# Patient Record
Sex: FEMALE | Race: BLACK OR AFRICAN AMERICAN | NOT HISPANIC OR LATINO | URBAN - METROPOLITAN AREA
[De-identification: names, ages, dates, MRNs, and addresses within clinical notes are randomized per-mention and may not be internally consistent; named-entity substitution may affect disease eponyms.]

---

## 2024-02-23 ENCOUNTER — HOSPITAL ENCOUNTER (EMERGENCY)
Facility: HOSPITAL | Age: 29
Discharge: HOME | End: 2024-02-23
Payer: COMMERCIAL

## 2024-02-23 VITALS
OXYGEN SATURATION: 100 % | TEMPERATURE: 97.9 F | BODY MASS INDEX: 32.1 KG/M2 | WEIGHT: 170 LBS | DIASTOLIC BLOOD PRESSURE: 65 MMHG | RESPIRATION RATE: 16 BRPM | SYSTOLIC BLOOD PRESSURE: 113 MMHG | HEIGHT: 61 IN | HEART RATE: 86 BPM

## 2024-02-23 DIAGNOSIS — J02.9 PHARYNGITIS, UNSPECIFIED ETIOLOGY: Primary | ICD-10-CM

## 2024-02-23 LAB
FLUAV RNA RESP QL NAA+PROBE: NOT DETECTED
FLUBV RNA RESP QL NAA+PROBE: NOT DETECTED
RSV RNA RESP QL NAA+PROBE: NOT DETECTED
S PYO DNA THROAT QL NAA+PROBE: NOT DETECTED
SARS-COV-2 RNA RESP QL NAA+PROBE: NOT DETECTED

## 2024-02-23 PROCEDURE — 99283 EMERGENCY DEPT VISIT LOW MDM: CPT

## 2024-02-23 PROCEDURE — 99284 EMERGENCY DEPT VISIT MOD MDM: CPT | Performed by: PHYSICIAN ASSISTANT

## 2024-02-23 PROCEDURE — 87637 SARSCOV2&INF A&B&RSV AMP PRB: CPT | Performed by: PHYSICIAN ASSISTANT

## 2024-02-23 PROCEDURE — 87651 STREP A DNA AMP PROBE: CPT | Performed by: PHYSICIAN ASSISTANT

## 2024-02-23 RX ORDER — ACETAMINOPHEN 325 MG/1
975 TABLET ORAL ONCE
Status: COMPLETED | OUTPATIENT
Start: 2024-02-23 | End: 2024-02-23

## 2024-02-23 RX ADMIN — ACETAMINOPHEN 975 MG: 325 TABLET ORAL at 12:27

## 2024-02-23 ASSESSMENT — COLUMBIA-SUICIDE SEVERITY RATING SCALE - C-SSRS
6. HAVE YOU EVER DONE ANYTHING, STARTED TO DO ANYTHING, OR PREPARED TO DO ANYTHING TO END YOUR LIFE?: NO
2. HAVE YOU ACTUALLY HAD ANY THOUGHTS OF KILLING YOURSELF?: NO
1. IN THE PAST MONTH, HAVE YOU WISHED YOU WERE DEAD OR WISHED YOU COULD GO TO SLEEP AND NOT WAKE UP?: NO

## 2024-02-23 ASSESSMENT — LIFESTYLE VARIABLES
EVER FELT BAD OR GUILTY ABOUT YOUR DRINKING: NO
EVER HAD A DRINK FIRST THING IN THE MORNING TO STEADY YOUR NERVES TO GET RID OF A HANGOVER: NO
HAVE PEOPLE ANNOYED YOU BY CRITICIZING YOUR DRINKING: NO
HAVE YOU EVER FELT YOU SHOULD CUT DOWN ON YOUR DRINKING: NO

## 2024-02-23 ASSESSMENT — PAIN SCALES - GENERAL: PAINLEVEL_OUTOF10: 6

## 2024-02-23 ASSESSMENT — PAIN - FUNCTIONAL ASSESSMENT: PAIN_FUNCTIONAL_ASSESSMENT: 0-10

## 2024-02-23 NOTE — Clinical Note
Karen Sykes was seen and treated in our emergency department on 2/23/2024.  She may return to work on 02/25/2024.       If you have any questions or concerns, please don't hesitate to call.      Hawa Bryant PA-C

## 2024-02-23 NOTE — ED PROVIDER NOTES
"This is a 28-year-old female with no significant past medical history presents to the ED with approximately 2 to 3 days of sore throat.  She also endorses a minimally productive cough.  She denies any fevers or chills.  She does not that she had a headache which has since resolved.  She endorses generalized malaise.  She denies body aches, nausea, vomiting, diarrhea, nasal congestion, rhinorrhea, known sick contacts.  She states otherwise she has been in her normal state of health.  She has been able to tolerate p.o. intake.      History provided by:  Patient   used: No             Visit Vitals  /65   Pulse 86   Temp 36.6 °C (97.9 °F) (Temporal)   Resp 16   Ht 1.549 m (5' 1\")   Wt 77.1 kg (170 lb)   BMI 32.12 kg/m²   BSA 1.82 m²          Physical Exam     Physical exam:   Gen.: Vitals noted no distress afebrile. Normal phonation, no stridor, no trismus.   Eyes: PERRL. EOMI. no scleral icterus. Eye lids without lesions.   ENT: Posterior oropharynx with erythema without any associated areas exudates or edema. Uvula is in the midline and nonedematous. No Kody's Angina.  Hearing grossly intact. Mastoids nontender.   Neck: Supple. No meningismus through full range of motion. No lymphadenopathy.   Cardiac: Regular rate rhythm. No murmurs, gallops, rubs  Lungs: Good aeration throughout. No adventitious breath sounds. No wheezes, rhonchi, rales  Extremities: No peripheral edema. Full range of motion. Moves all extremities freely.   Skin: No rash. Warm and dry  Neuro: No focal neurologic deficits. CN 2-12 grossly intact        Labs Reviewed   GROUP A STREPTOCOCCUS, PCR   RSV PCR   SARS-COV-2 AND INFLUENZA A/B PCR       No orders to display         ED Course & MDM     Medical Decision Making  This is a 28-year-old female with no significant past medical history presents to the ED with a sore throat and cough for the past 2 to 3 days.  Vital stable upon arrival to the ED.  On physical examination " she was overall well-appearing.  Lungs clear to auscultation.  Posterior oropharynx mildly erythematous without edema, exudates, or visible peritonsillar abscess.  Uvula midline.  Normal phonation.  Tolerating own secretions.  Based on her examination I have low suspicion for retropharyngeal abscess.  Strep swab obtained as well as viral swab.  Prior to her swabs resulting patient stated that she needed to leave the ED.  After after patient was discharged her swabs did result as negative for RSV, flu, COVID, and strep.  I did attempt to call the patient to inform her of these results, however her phone went straight to [facility].      Amount and/or Complexity of Data Reviewed  Labs: ordered.    Risk  OTC drugs.         Diagnoses as of 02/23/24 1418   Pharyngitis, unspecified etiology       Procedures    STACY Bedolla, PAElizabethC     Hawa Bryant PA-C  02/23/24 1428

## 2024-03-14 ENCOUNTER — HOSPITAL ENCOUNTER (EMERGENCY)
Facility: HOSPITAL | Age: 29
Discharge: HOME | End: 2024-03-15
Payer: COMMERCIAL

## 2024-03-14 ENCOUNTER — CLINICAL SUPPORT (OUTPATIENT)
Dept: EMERGENCY MEDICINE | Facility: HOSPITAL | Age: 29
End: 2024-03-14
Payer: COMMERCIAL

## 2024-03-14 DIAGNOSIS — J10.1 INFLUENZA A: Primary | ICD-10-CM

## 2024-03-14 PROCEDURE — 93010 ELECTROCARDIOGRAM REPORT: CPT

## 2024-03-14 PROCEDURE — 93005 ELECTROCARDIOGRAM TRACING: CPT

## 2024-03-14 PROCEDURE — 99283 EMERGENCY DEPT VISIT LOW MDM: CPT

## 2024-03-14 PROCEDURE — 99284 EMERGENCY DEPT VISIT MOD MDM: CPT | Performed by: NURSE PRACTITIONER

## 2024-03-14 ASSESSMENT — PAIN - FUNCTIONAL ASSESSMENT: PAIN_FUNCTIONAL_ASSESSMENT: 0-10

## 2024-03-14 ASSESSMENT — COLUMBIA-SUICIDE SEVERITY RATING SCALE - C-SSRS
6. HAVE YOU EVER DONE ANYTHING, STARTED TO DO ANYTHING, OR PREPARED TO DO ANYTHING TO END YOUR LIFE?: NO
1. IN THE PAST MONTH, HAVE YOU WISHED YOU WERE DEAD OR WISHED YOU COULD GO TO SLEEP AND NOT WAKE UP?: NO
2. HAVE YOU ACTUALLY HAD ANY THOUGHTS OF KILLING YOURSELF?: NO

## 2024-03-14 ASSESSMENT — LIFESTYLE VARIABLES
HAVE YOU EVER FELT YOU SHOULD CUT DOWN ON YOUR DRINKING: NO
EVER HAD A DRINK FIRST THING IN THE MORNING TO STEADY YOUR NERVES TO GET RID OF A HANGOVER: NO
EVER FELT BAD OR GUILTY ABOUT YOUR DRINKING: NO
HAVE PEOPLE ANNOYED YOU BY CRITICIZING YOUR DRINKING: NO

## 2024-03-14 ASSESSMENT — PAIN SCALES - GENERAL: PAINLEVEL_OUTOF10: 7

## 2024-03-15 ENCOUNTER — APPOINTMENT (OUTPATIENT)
Dept: RADIOLOGY | Facility: HOSPITAL | Age: 29
End: 2024-03-15
Payer: COMMERCIAL

## 2024-03-15 VITALS
DIASTOLIC BLOOD PRESSURE: 66 MMHG | RESPIRATION RATE: 20 BRPM | OXYGEN SATURATION: 98 % | SYSTOLIC BLOOD PRESSURE: 130 MMHG | WEIGHT: 170 LBS | HEIGHT: 61 IN | HEART RATE: 90 BPM | BODY MASS INDEX: 32.1 KG/M2 | TEMPERATURE: 98.4 F

## 2024-03-15 LAB
ATRIAL RATE: 100 BPM
FLUAV RNA RESP QL NAA+PROBE: DETECTED
FLUBV RNA RESP QL NAA+PROBE: NOT DETECTED
P AXIS: 24 DEGREES
P OFFSET: 203 MS
P ONSET: 145 MS
PR INTERVAL: 148 MS
Q ONSET: 219 MS
QRS COUNT: 16 BEATS
QRS DURATION: 70 MS
QT INTERVAL: 326 MS
QTC CALCULATION(BAZETT): 420 MS
QTC FREDERICIA: 386 MS
R AXIS: 29 DEGREES
SARS-COV-2 RNA RESP QL NAA+PROBE: NOT DETECTED
T AXIS: 13 DEGREES
T OFFSET: 382 MS
VENTRICULAR RATE: 100 BPM

## 2024-03-15 PROCEDURE — 87636 SARSCOV2 & INF A&B AMP PRB: CPT | Performed by: EMERGENCY MEDICINE

## 2024-03-15 PROCEDURE — 2500000001 HC RX 250 WO HCPCS SELF ADMINISTERED DRUGS (ALT 637 FOR MEDICARE OP): Performed by: NURSE PRACTITIONER

## 2024-03-15 PROCEDURE — 71046 X-RAY EXAM CHEST 2 VIEWS: CPT | Performed by: RADIOLOGY

## 2024-03-15 PROCEDURE — 71046 X-RAY EXAM CHEST 2 VIEWS: CPT

## 2024-03-15 RX ORDER — ACETAMINOPHEN 325 MG/1
650 TABLET ORAL ONCE
Status: DISCONTINUED | OUTPATIENT
Start: 2024-03-15 | End: 2024-03-15

## 2024-03-15 RX ORDER — ACETAMINOPHEN 325 MG/1
975 TABLET ORAL ONCE
Status: COMPLETED | OUTPATIENT
Start: 2024-03-15 | End: 2024-03-15

## 2024-03-15 RX ORDER — ACETAMINOPHEN 325 MG/1
650 TABLET ORAL EVERY 6 HOURS PRN
Qty: 30 TABLET | Refills: 0 | Status: SHIPPED | OUTPATIENT
Start: 2024-03-15

## 2024-03-15 RX ORDER — IBUPROFEN 600 MG/1
600 TABLET ORAL ONCE
Status: COMPLETED | OUTPATIENT
Start: 2024-03-15 | End: 2024-03-15

## 2024-03-15 RX ORDER — IBUPROFEN 600 MG/1
600 TABLET ORAL EVERY 6 HOURS PRN
Qty: 15 TABLET | Refills: 0 | Status: SHIPPED | OUTPATIENT
Start: 2024-03-15 | End: 2024-03-22

## 2024-03-15 RX ADMIN — IBUPROFEN 600 MG: 600 TABLET, FILM COATED ORAL at 00:38

## 2024-03-15 RX ADMIN — ACETAMINOPHEN 975 MG: 325 TABLET ORAL at 00:38

## 2024-03-15 ASSESSMENT — PAIN SCALES - GENERAL
PAINLEVEL_OUTOF10: 0 - NO PAIN
PAINLEVEL_OUTOF10: 0 - NO PAIN

## 2024-03-15 ASSESSMENT — ENCOUNTER SYMPTOMS: COUGH: 1

## 2024-03-15 ASSESSMENT — PAIN - FUNCTIONAL ASSESSMENT: PAIN_FUNCTIONAL_ASSESSMENT: 0-10

## 2024-03-15 ASSESSMENT — PAIN DESCRIPTION - PROGRESSION: CLINICAL_PROGRESSION: NOT CHANGED

## 2024-03-15 NOTE — ED TRIAGE NOTES
"Patient reports \"lung pain\", a nonproductive cough and burning when she breathes and coughs.   "

## 2024-03-15 NOTE — ED PROVIDER NOTES
"Emergency Department Encounter  [facility] EMERGENCY MEDICINE    Patient: Karen Sykes  MRN: [id number]  : 1995  Date of Evaluation: 3/14/2024  ED Provider: VANDANA Cunha      Chief Complaint       Chief Complaint   Patient presents with    Cough        Limitations to History: none  Historian: patient  Records reviewed: EMR inpatient and outpatient notes, Care Everywhere    This is a 28-year-old female who presents to the emergency room with a cough and \"lung pain.\"  Patient reports symptoms started a couple of days ago.  Denies any sore throat, ear pain, dysuria, abdominal pain.  Denies any known sick contacts.  Denies taking any over-the-counter medications for symptoms.  Patient endorses a subjective fever without any chills.    PMH: Denies  PSH: Denies  Allergies: NKDA  Social HX: + smoker, denies alcohol or drug use.  Family HX: No family history pertinent to current presenting problem  Medications: Denies    ROS:     Review of Systems   Respiratory:  Positive for cough.    Cardiovascular:  Positive for chest pain.     14 systems reviewed and otherwise acutely negative except as in the [place].        Past History   History reviewed. No pertinent past medical history.  History reviewed. No pertinent surgical history.      Medications/Allergies     Previous Medications    No medications on file     No Known Allergies     Physical Exam       ED Triage Vitals   Temperature Heart Rate Respirations BP   24   (!) 38 °C (100.4 °F) 87 16 97/64      Pulse Ox Temp Source Heart Rate Source Patient Position   24   98 % Oral Monitor Sitting      BP Location FiO2 (%)     03/14/24 2323 03/15/24 0039     Left arm 21 %       Physical Exam:    Appearance: Alert, oriented , cooperative,  in no acute distress. Well nourished & well hydrated.    Skin: Intact,  dry skin, no lesions, rash, " petechiae or purpura.     Eyes: PERRLA, EOMs intact.    ENT: Hearing grossly intact. External auditory canals patent, tympanic membranes intact with visible landmarks. Nares patent, mucus membranes moist. Dentition without lesions. Pharynx clear, uvula midline.     Neck: Supple, without meningismus.     Pulmonary: Clear bilaterally with good chest wall excursion. No rales, rhonchi or wheezing. No accessory muscle use or stridor.    Cardiac: Normal S1, S2 without murmur, rub, gallop or extrasystole. No JVD, Carotids without bruits.    Abdomen: Soft, nontender, active bowel sounds.  No palpable organomegaly.  No rebound or guarding.      Musculoskeletal: Full range of motion. no pain, edema, or deformity. Pulses full and equal. No cyanosis, clubbing, or edema.    Neurological:  Normal sensation, no weakness, no focal findings identified.    Psychiatric: Appropriate mood and affect.       Diagnostics   Labs:  Results for orders placed or performed during the hospital encounter of 03/14/24 (from the past 24 hour(s))   Sars-CoV-2 and Influenza A/B PCR   Result Value Ref Range    Flu A Result Detected (A) Not Detected    Flu B Result Not Detected Not Detected    Coronavirus 2019, PCR Not Detected Not Detected      Radiographs:  XR chest 2 views   Final Result   1.  No acute cardiopulmonary process.        I personally reviewed the images/study and I agree with the findings   as stated by resident physician Dr. Vincent Toth . This study   was interpreted at [place], [place].        MACRO:   None        Signed by: Kyra Quesada 3/15/2024 2:08 AM   Dictation workstation:   [id number]          Procedures:   Procedures       Assessment   In brief, Karen Sykes is a 28 y.o. female who presented to the emergency department with a cough and lung pain.          ED Course/MDM     Diagnoses as of 03/15/24 0220   Influenza A      Visit Vitals  /73   Pulse (!) 101  "  Temp (!) 38 °C (100.4 °F) (Oral)   Resp 20   Ht 1.549 m (5' 1\")   Wt 77.1 kg (170 lb)   SpO2 98%   BMI 32.12 kg/m²   BSA 1.82 m²       Medications   acetaminophen (Tylenol) tablet 975 mg (975 mg oral Given 3/15/24 0038)   ibuprofen tablet 600 mg (600 mg oral Given 3/15/24 0038)       Patient remained stable while in the emergency department. Previous outpatient and ED records were reviewed. Outside records were reviewed.  Differentials include influenza, COVID, viral illness, pneumonia.  Influenza and COVID swab was obtained.  Patient tested positive for flu A.  Chest x-ray shows no acute cardiopulmonary process.  Patient received Tylenol 975 mg p.o. once and ibuprofen 600 mg p.o. once.  Vital signs improved after Tylenol and ibuprofen.  Patient was advised to rest, drink plenty of fluids, follow-up with her primary care doctor and return the emergency room with worsening symptoms.    Final Impression      1. Influenza A          DISPOSITION  Disposition: Discharged home    Comment: Please note this report has been produced using speech recognition software and may contain errors related to that system including errors in grammar, punctuation, and spelling, as well as words and phrases that may be inappropriate.  If there are any questions or concerns please feel free to contact the dictating provider for clarification.    VANDANA Cunha APRN-CNP  03/15/24 0222    "